# Patient Record
Sex: MALE | Race: WHITE | URBAN - METROPOLITAN AREA
[De-identification: names, ages, dates, MRNs, and addresses within clinical notes are randomized per-mention and may not be internally consistent; named-entity substitution may affect disease eponyms.]

---

## 2022-04-27 ENCOUNTER — OFFICE (OUTPATIENT)
Dept: URBAN - METROPOLITAN AREA CLINIC 106 | Facility: CLINIC | Age: 62
End: 2022-04-27

## 2022-04-27 VITALS — TEMPERATURE: 97.7 F | WEIGHT: 184.4 LBS

## 2022-04-27 DIAGNOSIS — K14.5 TONGUE FISSURE: ICD-10-CM

## 2022-04-27 DIAGNOSIS — R14.1 BLOATING, GAS PAIN: ICD-10-CM

## 2022-04-27 DIAGNOSIS — K14.6 BURNING TONGUE SYNDROME: ICD-10-CM

## 2022-04-27 DIAGNOSIS — R10.30 LOWER ABDOMINAL PAIN: ICD-10-CM

## 2022-04-27 DIAGNOSIS — K44.9 HIATAL HERNIA: ICD-10-CM

## 2022-04-27 DIAGNOSIS — K21.9: ICD-10-CM

## 2022-04-27 PROCEDURE — 99204 OFFICE O/P NEW MOD 45 MIN: CPT | Performed by: INTERNAL MEDICINE

## 2022-04-27 NOTE — SERVICEHPINOTES
The patient is seen for the evaluation of suspected GERD.    Noted the onset of   throat pain  7 - 10  months   ago  .   Symptoms have been occurring   many   time(s) per   months  .    During a given day, they are most prevalent   all day long  .    They are exacerbated by   nothing specific  .   In the past, the patient has tried   Pantoprazole and Omeprazole  .   Currently takes   Omeprazole   dosed   twice per day  .   On this therapy, symptom response has been   intermittent  .    Associated symptoms include   frequent need to clear the throat  .      Has also noted atypical (non-esophageal) symptoms including   .    A prior EGD has been performed and showed   severe reflux esophagitis  .

## 2022-10-19 ENCOUNTER — OFFICE (OUTPATIENT)
Dept: URBAN - METROPOLITAN AREA CLINIC 106 | Facility: CLINIC | Age: 62
End: 2022-10-19

## 2022-10-19 VITALS — WEIGHT: 176.4 LBS

## 2022-10-19 DIAGNOSIS — K21.9: ICD-10-CM

## 2022-10-19 DIAGNOSIS — R10.30 LOWER ABDOMINAL PAIN: ICD-10-CM

## 2022-10-19 DIAGNOSIS — R14.1 BLOATING, GAS PAIN: ICD-10-CM

## 2022-10-19 DIAGNOSIS — K14.6 BURNING TONGUE SYNDROME: ICD-10-CM

## 2022-10-19 DIAGNOSIS — K44.9 HIATAL HERNIA: ICD-10-CM

## 2022-10-19 DIAGNOSIS — K29.70 GASTRITIS, UNSPECIFIED W/O BLEEDING: ICD-10-CM

## 2022-10-19 DIAGNOSIS — K14.5 TONGUE FISSURE: ICD-10-CM

## 2022-10-19 DIAGNOSIS — K64.8 HEMORRHOIDS, INTERNAL W/ BLEEDING: ICD-10-CM

## 2022-10-19 DIAGNOSIS — K57.30 DIVERTICULOSIS, COLON: ICD-10-CM

## 2022-10-19 PROCEDURE — 99214 OFFICE O/P EST MOD 30 MIN: CPT | Performed by: INTERNAL MEDICINE

## 2022-10-19 RX ORDER — OMEPRAZOLE 40 MG/1
CAPSULE, DELAYED RELEASE ORAL
Qty: 180 | Status: ACTIVE
Start: 2022-10-19

## 2022-10-19 NOTE — SERVICEHPINOTES
Patient came in for follow up after colonoscopy and EGD had some gas and bloating after the test but is feeling well at this time. Denies any abdominal pain, nausea, vomiting, diarrhea or rectal bleeding.

## 2023-02-15 ENCOUNTER — OFFICE (OUTPATIENT)
Dept: URBAN - METROPOLITAN AREA CLINIC 106 | Facility: CLINIC | Age: 63
End: 2023-02-15

## 2023-02-15 VITALS — WEIGHT: 175 LBS

## 2023-02-15 DIAGNOSIS — K64.8 OTHER HEMORRHOIDS: ICD-10-CM

## 2023-02-15 PROCEDURE — 46221 LIGATION OF HEMORRHOID(S): CPT | Performed by: INTERNAL MEDICINE

## 2023-02-15 RX ORDER — HYDROCORTISONE 25 MG/G
CREAM TOPICAL
Qty: 30 | Status: ACTIVE
Start: 2023-02-15

## 2023-02-15 NOTE — SERVICEHPINOTES
Patient came with a history of frequent episodes of rectal bleeding and wants to have HBL done. Denies any rectal pain.

## 2023-03-22 ENCOUNTER — OFFICE (OUTPATIENT)
Dept: URBAN - METROPOLITAN AREA CLINIC 106 | Facility: CLINIC | Age: 63
End: 2023-03-22

## 2023-03-22 VITALS — WEIGHT: 174 LBS

## 2023-03-22 DIAGNOSIS — K64.8 OTHER HEMORRHOIDS: ICD-10-CM

## 2023-03-22 PROCEDURE — 46221 LIGATION OF HEMORRHOID(S): CPT | Performed by: INTERNAL MEDICINE

## 2023-04-12 ENCOUNTER — OFFICE (OUTPATIENT)
Dept: URBAN - METROPOLITAN AREA CLINIC 106 | Facility: CLINIC | Age: 63
End: 2023-04-12

## 2023-04-12 VITALS — WEIGHT: 173.6 LBS

## 2023-04-12 DIAGNOSIS — K64.8 OTHER HEMORRHOIDS: ICD-10-CM

## 2023-04-12 PROCEDURE — 46221 LIGATION OF HEMORRHOID(S): CPT | Performed by: INTERNAL MEDICINE
